# Patient Record
Sex: MALE | Race: WHITE | Employment: FULL TIME | URBAN - METROPOLITAN AREA
[De-identification: names, ages, dates, MRNs, and addresses within clinical notes are randomized per-mention and may not be internally consistent; named-entity substitution may affect disease eponyms.]

---

## 2018-05-12 PROBLEM — Z95.5 PRESENCE OF DRUG COATED STENT IN LAD CORONARY ARTERY: Status: ACTIVE | Noted: 2018-05-12

## 2018-05-12 PROBLEM — I25.10 CORONARY ARTERY DISEASE INVOLVING NATIVE CORONARY ARTERY OF NATIVE HEART WITHOUT ANGINA PECTORIS: Status: ACTIVE | Noted: 2018-05-12

## 2018-08-14 PROBLEM — N40.0 BPH WITHOUT OBSTRUCTION/LOWER URINARY TRACT SYMPTOMS: Status: ACTIVE | Noted: 2018-08-14

## 2018-08-14 PROBLEM — R97.20 ELEVATED PSA: Status: ACTIVE | Noted: 2018-08-14

## 2018-08-14 PROBLEM — E66.3 OVERWEIGHT (BMI 25.0-29.9): Status: ACTIVE | Noted: 2018-08-14

## 2019-05-28 PROCEDURE — 81015 MICROSCOPIC EXAM OF URINE: CPT | Performed by: UROLOGY

## 2019-06-06 PROBLEM — Z01.818 PREOP EXAMINATION: Status: ACTIVE | Noted: 2019-06-06

## 2019-09-23 PROBLEM — S32.010D COMPRESSION FRACTURE OF L1 VERTEBRA WITH ROUTINE HEALING: Status: ACTIVE | Noted: 2019-09-23

## 2019-09-27 PROBLEM — M54.50 ACUTE BILATERAL LOW BACK PAIN WITHOUT SCIATICA: Status: ACTIVE | Noted: 2019-09-27

## 2021-11-04 PROBLEM — R97.20 ELEVATED PSA: Status: RESOLVED | Noted: 2018-08-14 | Resolved: 2021-11-04

## 2025-01-17 RX ORDER — TAMSULOSIN HYDROCHLORIDE 0.4 MG/1
0.4 CAPSULE ORAL NIGHTLY
COMMUNITY

## 2025-01-17 NOTE — DISCHARGE INSTRUCTIONS
CYSTOSCOPY - Dr. Squires  Operative site:  Scant blood may appear in the urine and will clear in a few days. Drinking water and clear liquids is encouraged.  .  Local Anesthesia:  Resume your normal diet and activity as tolerated avoiding stress to the operative site. Caffeine, alcohol, citrus or spicy foods may worsen burning or urgency.      General anesthesia / Local with sedation:  The sedation stays in your body about 24 hours. You may have headache, soreness and aching, nausea and vomiting, dizziness, tiredness.  Sore throat and hoarseness can be present after general anesthesia only.  Drink liquids and eat light foods. Advance to your regular diet as tolerated Remain on liquids only if nausea or vomiting is present.  NO ALCHOHOLIC BEVERAGES FOR 24 HOURS.  For the next 24 hours rest, move cautiously, do not drive, operate equipment, or make any personal or legal decisions.      If you have a catheter:  Your catheter remains in place because a balloon close to the catheter tip was inflated with fluid immediately after the catheter’s insertion. The catheter will be removed in 3-4 days as a nurses visit. After removing the catheter,  it is normal to experience some stinging or burning with urination.    If prescription medication is ordered:  Take as directed.  Do not take on an empty stomach.   Do not drive or operate equipment.  Do not drink alcohol.  Call your doctor for:  Excessive bleeding/discoloration  Severe pain not controlled by pain medication.      Persistent nausea and vomiting.                        Temperature over 101 F.   Skin rash and general body itching.  Other concerns or questions not addressed in these instructions.    Follow any additional instructions given by the surgeon.   IN CASE OF EMERGENCY GO TO THE NEAREST EMERGENCY ROOM.   Call the office for an appointment in 2-4 weeks.  [phone]       [facility] IN[facility]CTIONS  [facility]  The medication  that you received for sedation or general anesthesia can last up to 24 hours. Your judgment and reflexes may be altered, even if you feel like your normal self.      We Recommend:   Do not drive any motor vehicle or bicycle   Avoid mowing the lawn, playing sports, or working with power tools/applicances (power saws, electric knives or mixers)   That you have someone stay with you on your first night home   Do not drink alcohol or take sleeping pills or tranquilizers   Do not sign legal documents within 24 hours of your procedure   If you had a nerve block for your surgery, take extra care not to put any pressure on your arm or hand for 24 hours    It is normal:  For you to have a sore throat if you had a breathing tube during surgery (while you were asleep!). The sore throat should get better within 48 hours. You can gargle with warm salt water (1/2 tsp in 4 oz warm water) or use a throat lozenge for comfort  To feel muscle aches or soreness especially in the abdomen, chest or neck. The achy feeling should go away in the next 24 hours  To feel weak, sleepy or \"wiped out\". Your should start feeling better in the next 24 hours.   To experience mild discomforts such as sore lip or tongue, headache, cramps, gas pains or a bloated feeling in your abdomen.   To experience mild back pain or soreness for a day or two if you had spinal or epidural anesthesia.   If you had laparoscopic surgery, to feel shoulder pain or discomfort on the day of surgery.   For some patients to have nausea after surgery/anesthesia    If you feel nausea or experience vomiting:   Try to move around less.   Eat less than usual or drink only liquids until the next morning   Nausea should resolve in about 24 hours    If you have a problem when you are at home:    Call your surgeons office   Discharge Instructions: After Your Surgery  You’ve just had surgery. During surgery, you were given medicine called anesthesia to keep you relaxed and free of  pain. After surgery, you may have some pain or nausea. This is common. Here are some tips for feeling better and getting well after surgery.   Going home  Your healthcare provider will show you how to take care of yourself when you go home. They'll also answer your questions. Have an adult family member or friend drive you home. For the first 24 hours after your surgery:   Don't drive or use heavy equipment.  Don't make important decisions or sign legal papers.  Take medicines as directed.  Don't drink alcohol.  Have someone stay with you, if needed. They can watch for problems and help keep you safe.  Be sure to go to all follow-up visits with your healthcare provider. And rest after your surgery for as long as your provider tells you to.   Coping with pain  If you have pain after surgery, pain medicine will help you feel better. Take it as directed, before pain becomes severe. Also, ask your healthcare provider or pharmacist about other ways to control pain. This might be with heat, ice, or relaxation. And follow any other instructions your surgeon or nurse gives you.      Stay on schedule with your medicine.     Tips for taking pain medicine  To get the best relief possible, remember these points:   Pain medicines can upset your stomach. Taking them with a little food may help.  Most pain relievers taken by mouth need at least 20 to 30 minutes to start to work.  Don't wait till your pain becomes severe before you take your medicine. Try to time your medicine so that you can take it before starting an activity. This might be before you get dressed, go for a walk, or sit down for dinner.  Constipation is a common side effect of some pain medicines. Call your healthcare provider before taking any medicines such as laxatives or stool softeners to help ease constipation. Also ask if you should skip any foods. Drinking lots of fluids and eating foods such as fruits and vegetables that are high in fiber can also help.  Remember, don't take laxatives unless your surgeon has prescribed them.  Drinking alcohol and taking pain medicine can cause dizziness and slow your breathing. It can even be deadly. Don't drink alcohol while taking pain medicine.  Pain medicine can make you react more slowly to things. Don't drive or run machinery while taking pain medicine.  Your healthcare provider may tell you to take acetaminophen to help ease your pain. Ask them how much you're supposed to take each day. Acetaminophen or other pain relievers may interact with your prescription medicines or other over-the-counter (OTC) medicines. Some prescription medicines have acetaminophen and other ingredients in them. Using both prescription and OTC acetaminophen for pain can cause you to accidentally overdose. Read the labels on your OTC medicines with care. This will help you to clearly know the list of ingredients, how much to take, and any warnings. It may also help you not take too much acetaminophen. If you have questions or don't understand the information, ask your pharmacist or healthcare provider to explain it to you before you take the OTC medicine.   Managing nausea  Some people have an upset stomach (nausea) after surgery. This is often because of anesthesia, pain, or pain medicine, less movement of food in the stomach, or the stress of surgery. These tips will help you handle nausea and eat healthy foods as you get better. If you were on a special food plan before surgery, ask your healthcare provider if you should follow it while you get better. Check with your provider on how your eating should progress. It may depend on the surgery you had. These general tips may help:   Don't push yourself to eat. Your body will tell you when to eat and how much.  Start off with clear liquids and soup. They're easier to digest.  Next try semi-solid foods as you feel ready. These include mashed potatoes, applesauce, and gelatin.  Slowly move to solid  foods. Don’t eat fatty, rich, or spicy foods at first.  Don't force yourself to have 3 large meals a day. Instead eat smaller amounts more often.  Take pain medicines with a small amount of solid food, such as crackers or toast. This helps prevent nausea.  When to call your healthcare provider  Call your healthcare provider right away if you have any of these:   You still have too much pain, or the pain gets worse, after taking the medicine. The medicine may not be strong enough. Or there may be a complication from the surgery.  You feel too sleepy, dizzy, or groggy. The medicine may be too strong.  Side effects such as nausea or vomiting. Your healthcare provider may advise taking other medicines to .  Skin changes such as rash, itching, or hives. This may mean you have an allergic reaction. Your provider may advise taking other medicines.  The incision looks different (for instance, part of it opens up).  Bleeding or fluid leaking from the incision site, and weren't told to expect that.  Fever of 100.4°F (38°C) or higher, or as directed by your provider.  Call 911  Call 911 right away if you have:   Trouble breathing  Facial swelling    If you have obstructive sleep apnea   You were given anesthesia medicine during surgery to keep you comfortable and free of pain. After surgery, you may have more apnea spells because of this medicine and other medicines you were given. The spells may last longer than normal.    At home:  Keep using the continuous positive airway pressure (CPAP) device when you sleep. Unless your healthcare provider tells you not to, use it when you sleep, day or night. CPAP is a common device used to treat obstructive sleep apnea.  Talk with your provider before taking any pain medicine, muscle relaxants, or sedatives. Your provider will tell you about the possible dangers of taking these medicines.  Contact your provider if your sleeping changes a lot even when taking medicines as directed.

## 2025-01-20 ENCOUNTER — HOSPITAL ENCOUNTER (OUTPATIENT)
Facility: HOSPITAL | Age: 70
Setting detail: HOSPITAL OUTPATIENT SURGERY
Discharge: HOME | End: 2025-01-20
Attending: UROLOGY | Admitting: UROLOGY
Payer: MEDICARE

## 2025-01-20 ENCOUNTER — ANESTHESIA EVENT (OUTPATIENT)
Dept: SURGERY | Facility: HOSPITAL | Age: 70
End: 2025-01-20
Payer: MEDICARE

## 2025-01-20 ENCOUNTER — ANESTHESIA (OUTPATIENT)
Dept: SURGERY | Facility: HOSPITAL | Age: 70
End: 2025-01-20
Payer: MEDICARE

## 2025-01-20 VITALS
DIASTOLIC BLOOD PRESSURE: 64 MMHG | HEART RATE: 44 BPM | RESPIRATION RATE: 12 BRPM | OXYGEN SATURATION: 96 % | WEIGHT: 203 LBS | BODY MASS INDEX: 30.41 KG/M2 | HEIGHT: 68.5 IN | SYSTOLIC BLOOD PRESSURE: 125 MMHG | TEMPERATURE: 98 F

## 2025-01-20 PROCEDURE — 88307 TISSUE EXAM BY PATHOLOGIST: CPT | Performed by: UROLOGY

## 2025-01-20 PROCEDURE — 0TBB8ZZ EXCISION OF BLADDER, VIA NATURAL OR ARTIFICIAL OPENING ENDOSCOPIC: ICD-10-PCS | Performed by: UROLOGY

## 2025-01-20 RX ORDER — MORPHINE SULFATE 4 MG/ML
4 INJECTION, SOLUTION INTRAMUSCULAR; INTRAVENOUS EVERY 10 MIN PRN
Status: DISCONTINUED | OUTPATIENT
Start: 2025-01-20 | End: 2025-01-20

## 2025-01-20 RX ORDER — NALOXONE HYDROCHLORIDE 0.4 MG/ML
0.08 INJECTION, SOLUTION INTRAMUSCULAR; INTRAVENOUS; SUBCUTANEOUS AS NEEDED
Status: DISCONTINUED | OUTPATIENT
Start: 2025-01-20 | End: 2025-01-20

## 2025-01-20 RX ORDER — MORPHINE SULFATE 4 MG/ML
2 INJECTION, SOLUTION INTRAMUSCULAR; INTRAVENOUS EVERY 10 MIN PRN
Status: DISCONTINUED | OUTPATIENT
Start: 2025-01-20 | End: 2025-01-20

## 2025-01-20 RX ORDER — SODIUM CHLORIDE, SODIUM LACTATE, POTASSIUM CHLORIDE, CALCIUM CHLORIDE 600; 310; 30; 20 MG/100ML; MG/100ML; MG/100ML; MG/100ML
INJECTION, SOLUTION INTRAVENOUS CONTINUOUS
Status: DISCONTINUED | OUTPATIENT
Start: 2025-01-20 | End: 2025-01-20

## 2025-01-20 RX ORDER — HYDROMORPHONE HYDROCHLORIDE 1 MG/ML
0.2 INJECTION, SOLUTION INTRAMUSCULAR; INTRAVENOUS; SUBCUTANEOUS EVERY 5 MIN PRN
Status: DISCONTINUED | OUTPATIENT
Start: 2025-01-20 | End: 2025-01-20

## 2025-01-20 RX ORDER — HYDROMORPHONE HYDROCHLORIDE 1 MG/ML
0.6 INJECTION, SOLUTION INTRAMUSCULAR; INTRAVENOUS; SUBCUTANEOUS EVERY 5 MIN PRN
Status: DISCONTINUED | OUTPATIENT
Start: 2025-01-20 | End: 2025-01-20

## 2025-01-20 RX ORDER — GLYCOPYRROLATE 0.2 MG/ML
INJECTION, SOLUTION INTRAMUSCULAR; INTRAVENOUS AS NEEDED
Status: DISCONTINUED | OUTPATIENT
Start: 2025-01-20 | End: 2025-01-20 | Stop reason: SURG

## 2025-01-20 RX ORDER — EPHEDRINE SULFATE 50 MG/ML
INJECTION INTRAVENOUS AS NEEDED
Status: DISCONTINUED | OUTPATIENT
Start: 2025-01-20 | End: 2025-01-20 | Stop reason: SURG

## 2025-01-20 RX ORDER — HYDROMORPHONE HYDROCHLORIDE 1 MG/ML
0.4 INJECTION, SOLUTION INTRAMUSCULAR; INTRAVENOUS; SUBCUTANEOUS EVERY 5 MIN PRN
Status: DISCONTINUED | OUTPATIENT
Start: 2025-01-20 | End: 2025-01-20

## 2025-01-20 RX ORDER — ROCURONIUM BROMIDE 10 MG/ML
INJECTION, SOLUTION INTRAVENOUS AS NEEDED
Status: DISCONTINUED | OUTPATIENT
Start: 2025-01-20 | End: 2025-01-20 | Stop reason: SURG

## 2025-01-20 RX ORDER — MORPHINE SULFATE 10 MG/ML
6 INJECTION, SOLUTION INTRAMUSCULAR; INTRAVENOUS EVERY 10 MIN PRN
Status: DISCONTINUED | OUTPATIENT
Start: 2025-01-20 | End: 2025-01-20

## 2025-01-20 RX ORDER — ONDANSETRON 2 MG/ML
INJECTION INTRAMUSCULAR; INTRAVENOUS AS NEEDED
Status: DISCONTINUED | OUTPATIENT
Start: 2025-01-20 | End: 2025-01-20 | Stop reason: SURG

## 2025-01-20 RX ORDER — ACETAMINOPHEN 500 MG
1000 TABLET ORAL ONCE
Status: COMPLETED | OUTPATIENT
Start: 2025-01-20 | End: 2025-01-20

## 2025-01-20 RX ADMIN — ROCURONIUM BROMIDE 40 MG: 10 INJECTION, SOLUTION INTRAVENOUS at 13:41:00

## 2025-01-20 RX ADMIN — GLYCOPYRROLATE 0.2 MG: 0.2 INJECTION, SOLUTION INTRAMUSCULAR; INTRAVENOUS at 13:52:00

## 2025-01-20 RX ADMIN — ONDANSETRON 4 MG: 2 INJECTION INTRAMUSCULAR; INTRAVENOUS at 14:28:00

## 2025-01-20 RX ADMIN — EPHEDRINE SULFATE 5 MG: 50 INJECTION INTRAVENOUS at 13:54:00

## 2025-01-20 RX ADMIN — ROCURONIUM BROMIDE 10 MG: 10 INJECTION, SOLUTION INTRAVENOUS at 14:12:00
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
## 2025-01-20 NOTE — ANESTHESIA POSTPROCEDURE EVALUATION
Patient: Gee Doherty    Procedure Summary       Date: 01/20/25 Room / Location: [facility] MAIN OR 06 / [facility] MAIN OR    Anesthesia Start: 1337 Anesthesia Stop: 1445    Procedure: Cystoscopy, transurethral resection of a bladder tumor (Bladder) Diagnosis: (Gross hematuria, bladder mass)    Surgeons: Lori Squires MD Anesthesiologist: Donavon Malik MD    Anesthesia Type: general ASA Status: 2            Anesthesia Type: general    Vitals Value Taken Time   /60 01/20/25 1443   Temp 98.3 01/20/25 1445   Pulse 44 01/20/25 1445   Resp 14 01/20/25 1445   SpO2 99 % 01/20/25 1445   Vitals shown include unfiled device data.    [facility] AN Post Evaluation:   Patient Evaluated in PACU  Patient Participation: complete - patient participated  Level of Consciousness: awake and awake and alert  Pain Score: 0  Pain Management: adequateYes    Nausea/Vomiting: none  Cardiovascular Status: acceptable  Respiratory Status: acceptable  Postoperative Hydration acceptable      Neeru Espinoza CRNA  1/20/2025 2:45 PM

## 2025-01-20 NOTE — ANESTHESIA PROCEDURE NOTES
Airway  Date/Time: 1/20/2025 1:43 PM  Urgency: Elective      General Information and Staff    Patient location during procedure: OR  Resident/CRNA: Neeru Espinoza CRNA  Performed: CRNA   Performed by: Neeru Espinoza CRNA  Authorized by: Donavon Malik MD      Indications and Patient Condition  Indications for airway management: anesthesia  Spontaneous Ventilation: absent  Sedation level: deep  Preoxygenated: yes  Patient position: sniffing  Mask difficulty assessment: 1 - vent by mask    Final Airway Details  Final airway type: endotracheal airway      Successful airway: ETT  Cuffed: yes   Successful intubation technique: direct laryngoscopy  Endotracheal tube insertion site: oral  Blade size: #4  ETT size (mm): 7.5    Cormack-Lehane Classification: grade IIA - partial view of glottis  Placement verified by: capnometry   Measured from: teeth  Number of attempts at approach: 1
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
## 2025-01-20 NOTE — OPERATIVE REPORT
[facility]    Gee Dohetry Patient Status:  Hospital Outpatient Surgery    1955 MRN [id number]   Location [facility] OPERATING ROOM Attending Lori Squires MD   Hosp Day # 0 [facility] Ricardo Peter MD     Date of Operation; 2025    Procedure: Cystoscopy, Transurethral Resection of Bladder Tumor    Pre-Op Diagnosis:  Bladder Tumor    Post-Op Diagnosis: Same    Surgeon: Lori Squires M.D.    Anesthesia:  General    Indications:  hematuria, L anterior/lateral bladder mass    Findings: Anterior Urethra was normal.  The prostate was  obstructing/friable/ and hemorrhagic at bladder neck. The ureteral orifices were in the normal position. The bladder showed a tumor measuring 5cm, located L anterior/lateral. After resesction, the underlying tissue was supple and hemostatic.    Specimens: Bladder Tumor    Blood Loss:  Less than 1 ml    Complications:  None    Drains:  20 FR 3-way ryder    Technique:                               A general anesthesia was induced.  Preoperative  antibiotics were administered.  The patient was prepped and draped in  the dorsal lithotomy position.  Sequential compression devices were  in place on the lower leg.  A time-out was reviewed.     A cystoscope was passed through the urethra under direct vision and the urethra and bladder were examined, with the findings as described above.The 26-Tongan resectoscope sheath was passed into the  urethra, and then a 24-Tongan loop was used to resect the tumor. The specimen was irrigated out of the bladder with a Toomy sringe.The base of the resection was coagulated. The irrigation was returning with only the slightest pink tinge.  The patient tolerated the procedure well.  CBI will be administered in recovery.he will go home with ryder and have a void trial in 3-4 days.  Further treatment will be based on pathology.     Lori Squires MD  2025  2:35 PM

## 2025-06-10 ENCOUNTER — HOSPITAL ENCOUNTER (INPATIENT)
Facility: HOSPITAL | Age: 70
LOS: 1 days | Discharge: HOME | End: 2025-06-12
Attending: EMERGENCY MEDICINE | Admitting: HOSPITALIST
Payer: MEDICARE

## 2025-06-10 DIAGNOSIS — R31.9 HEMATURIA, UNSPECIFIED TYPE: Primary | ICD-10-CM

## 2025-06-10 LAB
ANION GAP SERPL CALC-SCNC: 9 MMOL/L (ref 0–18)
BASOPHILS # BLD AUTO: 0.03 X10(3) UL (ref 0–0.2)
BASOPHILS NFR BLD AUTO: 0.2 %
BUN BLD-MCNC: 14 MG/DL (ref 9–23)
BUN/CREAT SERPL: 14 (ref 10–20)
CALCIUM BLD-MCNC: 8.7 MG/DL (ref 8.7–10.4)
CHLORIDE SERPL-SCNC: 98 MMOL/L (ref 98–112)
CO2 SERPL-SCNC: 26 MMOL/L (ref 21–32)
CREAT BLD-MCNC: 1 MG/DL (ref 0.7–1.3)
DEPRECATED RDW RBC AUTO: 41.6 FL (ref 35.1–46.3)
EGFRCR SERPLBLD CKD-EPI 2021: 80 ML/MIN/1.73M2 (ref 60–?)
EOSINOPHIL # BLD AUTO: 0 X10(3) UL (ref 0–0.7)
EOSINOPHIL NFR BLD AUTO: 0 %
ERYTHROCYTE [DISTWIDTH] IN BLOOD BY AUTOMATED COUNT: 12.3 % (ref 11–15)
GLUCOSE BLD-MCNC: 135 MG/DL (ref 70–99)
HCT VFR BLD AUTO: 37.1 % (ref 39–53)
HGB BLD-MCNC: 12.5 G/DL (ref 13–17.5)
IMM GRANULOCYTES # BLD AUTO: 0.06 X10(3) UL (ref 0–1)
IMM GRANULOCYTES NFR BLD: 0.4 %
LYMPHOCYTES # BLD AUTO: 0.59 X10(3) UL (ref 1–4)
LYMPHOCYTES NFR BLD AUTO: 4 %
MCH RBC QN AUTO: 31 PG (ref 26–34)
MCHC RBC AUTO-ENTMCNC: 33.7 G/DL (ref 31–37)
MCV RBC AUTO: 92.1 FL (ref 80–100)
MONOCYTES # BLD AUTO: 1.17 X10(3) UL (ref 0.1–1)
MONOCYTES NFR BLD AUTO: 7.9 %
NEUTROPHILS # BLD AUTO: 12.95 X10 (3) UL (ref 1.5–7.7)
NEUTROPHILS # BLD AUTO: 12.95 X10(3) UL (ref 1.5–7.7)
NEUTROPHILS NFR BLD AUTO: 87.5 %
OSMOLALITY SERPL CALC.SUM OF ELEC: 279 MOSM/KG (ref 275–295)
PLATELET # BLD AUTO: 210 10(3)UL (ref 150–450)
POTASSIUM SERPL-SCNC: 3.5 MMOL/L (ref 3.5–5.1)
RBC # BLD AUTO: 4.03 X10(6)UL (ref 3.8–5.8)
SODIUM SERPL-SCNC: 133 MMOL/L (ref 136–145)
WBC # BLD AUTO: 14.8 X10(3) UL (ref 4–11)

## 2025-06-10 PROCEDURE — 51700 IRRIGATION OF BLADDER: CPT

## 2025-06-10 PROCEDURE — 99285 EMERGENCY DEPT VISIT HI MDM: CPT

## 2025-06-10 PROCEDURE — 36415 COLL VENOUS BLD VENIPUNCTURE: CPT

## 2025-06-10 PROCEDURE — 80048 BASIC METABOLIC PNL TOTAL CA: CPT | Performed by: EMERGENCY MEDICINE

## 2025-06-10 PROCEDURE — 85025 COMPLETE CBC W/AUTO DIFF WBC: CPT | Performed by: EMERGENCY MEDICINE

## 2025-06-11 PROBLEM — R31.9 HEMATURIA, UNSPECIFIED TYPE: Status: ACTIVE | Noted: 2025-06-11

## 2025-06-11 LAB
ANION GAP SERPL CALC-SCNC: 8 MMOL/L (ref 0–18)
BUN BLD-MCNC: 14 MG/DL (ref 9–23)
BUN/CREAT SERPL: 16.9 (ref 10–20)
CALCIUM BLD-MCNC: 8.6 MG/DL (ref 8.7–10.4)
CHLORIDE SERPL-SCNC: 104 MMOL/L (ref 98–112)
CO2 SERPL-SCNC: 27 MMOL/L (ref 21–32)
CREAT BLD-MCNC: 0.83 MG/DL (ref 0.7–1.3)
EGFRCR SERPLBLD CKD-EPI 2021: 93 ML/MIN/1.73M2 (ref 60–?)
GLUCOSE BLD-MCNC: 106 MG/DL (ref 70–99)
OSMOLALITY SERPL CALC.SUM OF ELEC: 289 MOSM/KG (ref 275–295)
POTASSIUM SERPL-SCNC: 3.8 MMOL/L (ref 3.5–5.1)
POTASSIUM SERPL-SCNC: 3.8 MMOL/L (ref 3.5–5.1)
SODIUM SERPL-SCNC: 139 MMOL/L (ref 136–145)

## 2025-06-11 PROCEDURE — 84132 ASSAY OF SERUM POTASSIUM: CPT | Performed by: INTERNAL MEDICINE

## 2025-06-11 PROCEDURE — 80048 BASIC METABOLIC PNL TOTAL CA: CPT | Performed by: INTERNAL MEDICINE

## 2025-06-11 RX ORDER — DOCUSATE SODIUM 100 MG/1
100 CAPSULE, LIQUID FILLED ORAL 2 TIMES DAILY
COMMUNITY

## 2025-06-11 RX ORDER — SENNOSIDES 8.6 MG
17.2 TABLET ORAL NIGHTLY PRN
Status: DISCONTINUED | OUTPATIENT
Start: 2025-06-11 | End: 2025-06-11

## 2025-06-11 RX ORDER — ACETAMINOPHEN 500 MG
500 TABLET ORAL EVERY 4 HOURS PRN
Status: DISCONTINUED | OUTPATIENT
Start: 2025-06-11 | End: 2025-06-12

## 2025-06-11 RX ORDER — SODIUM CHLORIDE, SODIUM LACTATE, POTASSIUM CHLORIDE, CALCIUM CHLORIDE 600; 310; 30; 20 MG/100ML; MG/100ML; MG/100ML; MG/100ML
INJECTION, SOLUTION INTRAVENOUS ONCE
Status: COMPLETED | OUTPATIENT
Start: 2025-06-11 | End: 2025-06-11

## 2025-06-11 RX ORDER — POTASSIUM CHLORIDE 1500 MG/1
40 TABLET, EXTENDED RELEASE ORAL EVERY 4 HOURS
Status: COMPLETED | OUTPATIENT
Start: 2025-06-11 | End: 2025-06-11

## 2025-06-11 RX ORDER — TAMSULOSIN HYDROCHLORIDE 0.4 MG/1
0.4 CAPSULE ORAL NIGHTLY
Status: DISCONTINUED | OUTPATIENT
Start: 2025-06-11 | End: 2025-06-12

## 2025-06-11 RX ORDER — POLYETHYLENE GLYCOL 3350 17 G/17G
17 POWDER, FOR SOLUTION ORAL DAILY
Status: DISCONTINUED | OUTPATIENT
Start: 2025-06-11 | End: 2025-06-12

## 2025-06-11 RX ORDER — SENNOSIDES 8.6 MG
17.2 TABLET ORAL 2 TIMES DAILY
Status: DISCONTINUED | OUTPATIENT
Start: 2025-06-11 | End: 2025-06-12

## 2025-06-11 RX ORDER — POLYETHYLENE GLYCOL 3350 17 G/17G
17 POWDER, FOR SOLUTION ORAL DAILY PRN
Status: DISCONTINUED | OUTPATIENT
Start: 2025-06-11 | End: 2025-06-11

## 2025-06-11 RX ORDER — MAGNESIUM HYDROXIDE 1200 MG/15ML
3000 LIQUID ORAL CONTINUOUS
Status: DISCONTINUED | OUTPATIENT
Start: 2025-06-11 | End: 2025-06-12

## 2025-06-11 RX ORDER — SODIUM PHOSPHATE, DIBASIC AND SODIUM PHOSPHATE, MONOBASIC 7; 19 G/230ML; G/230ML
1 ENEMA RECTAL ONCE AS NEEDED
Status: DISCONTINUED | OUTPATIENT
Start: 2025-06-11 | End: 2025-06-12

## 2025-06-11 RX ORDER — SODIUM CHLORIDE 9 MG/ML
INJECTION, SOLUTION INTRAVENOUS CONTINUOUS
Status: DISCONTINUED | OUTPATIENT
Start: 2025-06-11 | End: 2025-06-11

## 2025-06-11 RX ORDER — DOCUSATE SODIUM 100 MG/1
100 CAPSULE, LIQUID FILLED ORAL 2 TIMES DAILY
Status: DISCONTINUED | OUTPATIENT
Start: 2025-06-11 | End: 2025-06-12

## 2025-06-11 RX ORDER — LISINOPRIL 2.5 MG/1
2.5 TABLET ORAL DAILY
Status: DISCONTINUED | OUTPATIENT
Start: 2025-06-11 | End: 2025-06-12

## 2025-06-11 RX ORDER — BISACODYL 10 MG
10 SUPPOSITORY, RECTAL RECTAL
Status: DISCONTINUED | OUTPATIENT
Start: 2025-06-11 | End: 2025-06-12

## 2025-06-11 RX ORDER — ATORVASTATIN CALCIUM 40 MG/1
40 TABLET, FILM COATED ORAL NIGHTLY
Status: DISCONTINUED | OUTPATIENT
Start: 2025-06-11 | End: 2025-06-12

## 2025-06-11 NOTE — ED INITIAL ASSESSMENT (HPI)
Pt c/o hematuria and urinary retention that's been going on and off for the last few weeks but getting worse and now causing abd spasms. Pt states that he is bleeding much more today and passing clots.     Pt has bladder cancer
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
## 2025-06-11 NOTE — ED QUICK NOTES
Orders for admission, patient is aware of plan and ready to go upstairs. Any questions, please call ED RN Sanam at extension [phone].     Patient Covid vaccination status: Fully vaccinated     COVID Test Ordered in ED: None    COVID Suspicion at Admission: N/A    Running Infusions: Medication Infusions[1] None    Mental Status/LOC at time of transport: A/O x3    Other pertinent information: CBI in process  CIWA score: N/A   NIH score:  N/A               [1]

## 2025-06-11 NOTE — ED QUICK NOTES
2- 3000 mL irrigation bags completed for CBI. Per MD, continue with an additional 2-3000 mL irrigation bags.
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
## 2025-06-11 NOTE — PLAN OF CARE
Pt admitted from the ED for hematuria. Pt is alert and oriented on RA. CBI running at moderate rate. Pt is NPO. Pt denies pain. Urology is on consult. Call light is within reach and safety measures are in place.    Problem: GENITOURINARY - ADULT  Goal: Absence of urinary retention  Description: INTERVENTIONS:  - Assess patient’s ability to void and empty bladder  - Monitor intake/output and perform bladder scan as needed  - Follow urinary retention protocol/standard of care  - Consider collaborating with pharmacy to review patient's medication profile  - Implement strategies to promote bladder emptying  Outcome: Progressing     Problem: METABOLIC/FLUID AND ELECTROLYTES - ADULT  Goal: Electrolytes maintained within normal limits  Description: INTERVENTIONS:  - Monitor labs and rhythm and assess patient for signs and symptoms of electrolyte imbalances  - Administer electrolyte replacement as ordered  - Monitor response to electrolyte replacements, including rhythm and repeat lab results as appropriate  - Fluid restriction as ordered  - Instruct patient on fluid and nutrition restrictions as appropriate  Outcome: Progressing     Problem: HEMATOLOGIC - ADULT  Goal: Maintains hematologic stability  Description: INTERVENTIONS  - Assess for signs and symptoms of bleeding or hemorrhage  - Monitor labs and vital signs for trends  - Administer supportive blood products/factors, fluids and medications as ordered and appropriate  - Administer supportive blood products/factors as ordered and appropriate  Outcome: Progressing

## 2025-06-11 NOTE — PLAN OF CARE
Gee is A&Ox4. Denies pain. Vitals stable. Tolerating Diet. CBI continued. Manual Irrigation required this Afternoon due to blood clotting. After manual irrigation CBI output clear yellow. K+ replaced. Bowel regimen initiated today. IVF continuous. Ambulated with SBA. Call light in reach. Plan to clamp CBI at 0600 in morning.    Problem: Patient Centered Care  Goal: Patient preferences are identified and integrated in the patient's plan of care  Description: Interventions:  - What would you like us to know as we care for you?   - Provide timely, complete, and accurate information to patient/family  - Incorporate patient and family knowledge, values, beliefs, and cultural backgrounds into the planning and delivery of care  - Encourage patient/family to participate in care and decision-making at the level they choose  - Honor patient and family perspectives and choices  Outcome: Progressing     Problem: GENITOURINARY - ADULT  Goal: Absence of urinary retention  Description: INTERVENTIONS:  - Assess patient’s ability to void and empty bladder  - Monitor intake/output and perform bladder scan as needed  - Follow urinary retention protocol/standard of care  - Consider collaborating with pharmacy to review patient's medication profile  - Implement strategies to promote bladder emptying  Outcome: Progressing     Problem: METABOLIC/FLUID AND ELECTROLYTES - ADULT  Goal: Electrolytes maintained within normal limits  Description: INTERVENTIONS:  - Monitor labs and rhythm and assess patient for signs and symptoms of electrolyte imbalances  - Administer electrolyte replacement as ordered  - Monitor response to electrolyte replacements, including rhythm and repeat lab results as appropriate  - Fluid restriction as ordered  - Instruct patient on fluid and nutrition restrictions as appropriate  Outcome: Progressing     Problem: HEMATOLOGIC - ADULT  Goal: Maintains hematologic stability  Description: INTERVENTIONS  - Assess for  signs and symptoms of bleeding or hemorrhage  - Monitor labs and vital signs for trends  - Administer supportive blood products/factors, fluids and medications as ordered and appropriate  - Administer supportive blood products/factors as ordered and appropriate  Outcome: Progressing

## 2025-06-12 VITALS
DIASTOLIC BLOOD PRESSURE: 65 MMHG | OXYGEN SATURATION: 95 % | RESPIRATION RATE: 16 BRPM | BODY MASS INDEX: 27.92 KG/M2 | SYSTOLIC BLOOD PRESSURE: 115 MMHG | WEIGHT: 195 LBS | HEART RATE: 59 BPM | HEIGHT: 70 IN | TEMPERATURE: 98 F

## 2025-06-12 LAB
ANION GAP SERPL CALC-SCNC: 7 MMOL/L (ref 0–18)
BUN BLD-MCNC: 14 MG/DL (ref 9–23)
BUN/CREAT SERPL: 18.9 (ref 10–20)
CALCIUM BLD-MCNC: 8.4 MG/DL (ref 8.7–10.4)
CHLORIDE SERPL-SCNC: 105 MMOL/L (ref 98–112)
CO2 SERPL-SCNC: 27 MMOL/L (ref 21–32)
CREAT BLD-MCNC: 0.74 MG/DL (ref 0.7–1.3)
DEPRECATED RDW RBC AUTO: 42.6 FL (ref 35.1–46.3)
EGFRCR SERPLBLD CKD-EPI 2021: 96 ML/MIN/1.73M2 (ref 60–?)
ERYTHROCYTE [DISTWIDTH] IN BLOOD BY AUTOMATED COUNT: 12.3 % (ref 11–15)
GLUCOSE BLD-MCNC: 99 MG/DL (ref 70–99)
HCT VFR BLD AUTO: 36.6 % (ref 39–53)
HGB BLD-MCNC: 12.3 G/DL (ref 13–17.5)
MAGNESIUM SERPL-MCNC: 1.9 MG/DL (ref 1.6–2.6)
MCH RBC QN AUTO: 31.4 PG (ref 26–34)
MCHC RBC AUTO-ENTMCNC: 33.6 G/DL (ref 31–37)
MCV RBC AUTO: 93.4 FL (ref 80–100)
OSMOLALITY SERPL CALC.SUM OF ELEC: 289 MOSM/KG (ref 275–295)
PLATELET # BLD AUTO: 186 10(3)UL (ref 150–450)
POTASSIUM SERPL-SCNC: 4.1 MMOL/L (ref 3.5–5.1)
RBC # BLD AUTO: 3.92 X10(6)UL (ref 3.8–5.8)
SODIUM SERPL-SCNC: 139 MMOL/L (ref 136–145)
WBC # BLD AUTO: 8.2 X10(3) UL (ref 4–11)

## 2025-06-12 PROCEDURE — 85027 COMPLETE CBC AUTOMATED: CPT | Performed by: HOSPITALIST

## 2025-06-12 PROCEDURE — 80048 BASIC METABOLIC PNL TOTAL CA: CPT | Performed by: HOSPITALIST

## 2025-06-12 PROCEDURE — 83735 ASSAY OF MAGNESIUM: CPT | Performed by: HOSPITALIST

## 2025-06-12 RX ORDER — POLYETHYLENE GLYCOL 3350 17 G/17G
17 POWDER, FOR SOLUTION ORAL DAILY
Status: SHIPPED | COMMUNITY
Start: 2025-06-13

## 2025-06-12 RX ORDER — SULFAMETHOXAZOLE AND TRIMETHOPRIM 800; 160 MG/1; MG/1
1 TABLET ORAL 2 TIMES DAILY
Qty: 6 TABLET | Refills: 0 | Status: SHIPPED | OUTPATIENT
Start: 2025-06-12

## 2025-06-12 RX ORDER — SULFAMETHOXAZOLE AND TRIMETHOPRIM 800; 160 MG/1; MG/1
1 TABLET ORAL EVERY 12 HOURS SCHEDULED
Status: DISCONTINUED | OUTPATIENT
Start: 2025-06-12 | End: 2025-06-12

## 2025-06-12 NOTE — PROGRESS NOTES
[facility]  part of [facility]    Progress Note    Gee Doherty Patient Status:  Inpatient    1955 MRN [id number]   Location [facility] 4W/SW/SE Attending Chava Steven MD   Hosp Day # 1 PCP Ricardo Peter MD     Subjective:   Gee Doherty is a(n) 70 year old male admitted for hematuria s/p TURBT in 25  Urine is clear  CBI stopped this morning  No pain  No complaints      Objective:   Blood pressure 115/65, pulse 59, temperature 98.1 °F (36.7 °C), temperature source Oral, resp. rate 16, height 70\", weight 195 lb (88.5 kg), SpO2 95%.    Awake alert  Abd soft  Ryder irrigated and is clear  Removed ryder      Results:   Lab Results   Component Value Date    WBC 14.8 (H) 06/10/2025    HGB 12.5 (L) 06/10/2025    HCT 37.1 (L) 06/10/2025    .0 06/10/2025    CREATSERUM 0.83 2025    BUN 14 2025     2025    K 3.8 2025    K 3.8 2025     2025    CO2 27.0 2025     (H) 2025    CA 8.6 (L) 2025    ALB 4.6 2021    ALKPHO 87 2021    BILT 0.87 2021    TP 7.2 2021    AST 20 2021    ALT 24 2021    T4F 0.76 2015    TSH 4.170 2021    PSA 3.18 2021       No results found.        Assessment & Plan:     Hematuria, unspecified type  History of TURBT for benign path  Suspected UTI   No culture sent   Plan for Bactrim DS PO BID x 3 days   Dose here   Urine is clear   Ryder irrigated   Ryder removed    DC home  Follow up in 2 weeks with [facility] for UA/PVR check  Discussed with patient and nursing    Patient will require inpatient services that will reasonably be expected to span two midnight's based on the clinical documentation in H+P.   Based on patients current state of illness, I anticipate that, after discharge, patient will require TBD.      Juanito Chakraborty MD  2025

## 2025-06-12 NOTE — PLAN OF CARE
A/O x 4. Pt able to void s/p ryder removal, PVR 40mL. Denies pain. Up ad kwame. Tolerating diet. Patient cleared for discharge by all consulting MDs. Discharge instructions explained to patient. All medications reviewed, including which medications to start, continue, or stop taking. All follow up appointments reviewed. All discharge eduation explained to pt. Patient verbalized understanding, all questions answered.

## 2025-06-12 NOTE — PLAN OF CARE
Problem: Patient Centered Care  Goal: Patient preferences are identified and integrated in the patient's plan of care  Description: Interventions:  - What would you like us to know as we care for you? I am from home  - Provide timely, complete, and accurate information to patient/family  - Incorporate patient and family knowledge, values, beliefs, and cultural backgrounds into the planning and delivery of care  - Encourage patient/family to participate in care and decision-making at the level they choose  - Honor patient and family perspectives and choices  Outcome: Progressing     Problem: GENITOURINARY - ADULT  Goal: Absence of urinary retention  Description: INTERVENTIONS:  - Assess patient’s ability to void and empty bladder  - Monitor intake/output and perform bladder scan as needed  - Follow urinary retention protocol/standard of care  - Consider collaborating with pharmacy to review patient's medication profile  - Implement strategies to promote bladder emptying  Outcome: Progressing     Problem: METABOLIC/FLUID AND ELECTROLYTES - ADULT  Goal: Electrolytes maintained within normal limits  Description: INTERVENTIONS:  - Monitor labs and rhythm and assess patient for signs and symptoms of electrolyte imbalances  - Administer electrolyte replacement as ordered  - Monitor response to electrolyte replacements, including rhythm and repeat lab results as appropriate  - Fluid restriction as ordered  - Instruct patient on fluid and nutrition restrictions as appropriate  Outcome: Progressing     Problem: HEMATOLOGIC - ADULT  Goal: Maintains hematologic stability  Description: INTERVENTIONS  - Assess for signs and symptoms of bleeding or hemorrhage  - Monitor labs and vital signs for trends  - Administer supportive blood products/factors, fluids and medications as ordered and appropriate  - Administer supportive blood products/factors as ordered and appropriate  Outcome: Progressing

## 2025-06-12 NOTE — DISCHARGE SUMMARY
Hospitalist Discharge Summary    Admission Date/Time  6/10/2025  8:39 PM  Discharge Date  06/12/25    PCP  Ricardo Peter MD     Discharging Hospitalist:  Cheyenne Larkin NP with Dr Steven    Disposition:  Home, no needs     Follow Up Appointments  Micki Rosa PA-C  [address]  [facility] [address]  [phone]    Follow up in 2 week(s)  UA and PVR check    Ricardo Peter MD  [address]  [facility] [address]  [phone]    Follow up in 3 day(s)      Primary Hospital Problems/Hospital Course Summary  71 yo male who with hx HL, BPH, HTN, vitamin d def, CAD/MI S/P PCI LAD, eczema, bladder cancer s/p TURBT 5/22/2025 who presents with urinary retention with hematuria/clots as well as constipation s/p CBI with improvement.      Urinary Retention with hematuria/Clots  High Grade TCC Bladder Cancer   -5/22/2025 S/P TURBT with pathology c/w high grade TCC  -admission hgb 12.5, discharge hgb 12.3  -stop naproxen, use tylenol for pain  -resume asa 6/16  -6/10 s/p ryder with CBI  -follow up with urology PA 2 weeks for UA and PVR    Possible UTI  -UA suggestive of UTI in outpt setting, no urine sent in hospital  -bactrim x 3 days as per urology      Constipation   -bowel regimen     Chronic Medical Problems:     HTN- continue lisinipril  HL-continue statin  CAD/MI S/P PCI-continue statin. [facility]  BPH- continue flomax     Medication Changes  Hold asa until 6/16  Stop naproxen  Complete bactrim x 3 days for UTI       Change in Code Status: NO, full code     Discharge Medications       Medication List        PAUSE taking these medications      aspirin 81 MG Tabs  Wait to take this until: June 16, 2025            START taking these medications      Polyethylene Glycol 3350 17 g Pack  Commonly known as: MIRALAX  Start taking on: June 13, 2025     sulfamethoxazole-trimethoprim -160 MG Tabs per tablet  Commonly known as: Bactrim DS  Take 1 tablet by mouth 2  (two) times daily. Start taking 1 day prior to catheter removal            CHANGE how you take these medications      lisinopril 2.5 MG Tabs  Commonly known as: Prinivil; Zestril  Take 1 tablet (2.5 mg total) by mouth daily.  What changed: when to take this            CONTINUE taking these medications      atorvastatin 40 MG Tabs  Commonly known as: Lipitor  Take 1 tablet (40 mg total) by mouth nightly.     docusate sodium 100 MG Caps  Commonly known as: Colace     tamsulosin 0.4 MG Caps  Commonly known as: Flomax     Vitamin D 50 MCG (2000 UT) Tabs            STOP taking these medications      NAPROXEN SODIUM OR               Where to Get Your Medications        These medications were sent to [facility] [address] [place] [facility] [address] [facility] [phone], [phone]  [address] [facility] [address]      Phone: [phone]   sulfamethoxazole-trimethoprim -160 MG Tabs per tablet       I reconciled current and discharge medications on the day of discharge.      Pertinent Physical Exam At Time of Discharge  Vitals:    06/11/25 0917 06/11/25 1226 06/11/25 2034 06/12/25 0347   BP: 128/54 113/59 135/67 115/65   BP Location: Left arm Left arm Right arm Right arm   Pulse: 64 57 64 59   Resp: 18 18 18 16   Temp: 99 °F (37.2 °C) 98.3 °F (36.8 °C) 98.1 °F (36.7 °C)    TempSrc: Oral Oral Oral    SpO2: 92% 95% 98% 95%   Weight:       Height:            General: no acute distress  Heart: RRR  Lungs: CTAB  Abd: Soft, NT, ND  Neuro: no focal deficits    Total time coordinating care > 30 mins.    Patient had opportunity to ask questions and stated understanding and agreed with therapeutic plan as outlined.     Cheyenne Larkin NP  6/12/2025    Patient seen and examined independently.  Discussed with APN and agree with note above    Patient improved.  Stable for discharge to home    Gen: No acute distress, alert and oriented x3  Pulm: Lungs clear, normal respiratory effort  CV: Heart with regular rate and rhythm  Abd:  Abdomen soft  MSK: No Lower extremity edema    Time of discharge >30 minutes    [facility] Hospitalist  Chava Steven MD
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
## 2025-06-16 NOTE — PAYOR COMM NOTE
Request for Reconsideration    --------------  ADMISSION REVIEW     Payor: [employer]  Subscriber #:  [id number]  Authorization Number: [id number]    Admit date: 6/11/25  Admit time: 12:43 AM         6/11 Patient Seen in: [facility] Emergency Department    HPI    70-year-old male who presents to the emergency department given that he has had 3 days of intermittent hematuria.  Denies any fevers.  Has not had any urinary output since yesterday but has been passing clots.  No back pain or any nausea or emesis    Physical Exam     ED Triage Vitals [06/10/25 2034]   /57   Pulse 70   Resp 22   Temp 98.5 °F (36.9 °C)   Temp src Temporal   SpO2 97 %   O2 Device None (Room air)       Physical Exam    General: Mild distress  Head: Normocephalic and atraumatic.  Mouth/Throat/Ears/Nose: Oropharynx is clear   Eyes: Conjunctivae and EOM are normal.   Neck: Normal range of motion. Supple.   Cardiovascular: Normal rate, regular rhythm, normal heart sounds.  Respiratory/Chest: Clear and equal bilaterally. Exhibits no tenderness.  Gastrointestinal: Soft, suprapubic fullness, non-distended. Bowel sounds are normal.   Musculoskeletal:No swelling or deformity.   Neurological: Alert and appropriate. No focal deficits.   Skin: Skin is warm and dry. No pallor.  Psychiatric: Has a normal mood and affect.      ED Course        Labs Reviewed   BASIC METABOLIC PANEL (8) - Abnormal; Notable for the following components:       Result Value    Glucose 135 (*)     Sodium 133 (*)     All other components within normal limits   CBC WITH DIFFERENTIAL WITH PLATELET - Abnormal; Notable for the following components:    WBC 14.8 (*)     HGB 12.5 (*)     HCT 37.1 (*)     Neutrophil Absolute Prelim 12.95 (*)     Neutrophil Absolute 12.95 (*)     Lymphocyte Absolute 0.59 (*)     Monocyte Absolute 1.17 (*)     All other components within normal limits   URINE CULTURE, ROUTINE       MDM     Vitals:    06/10/25 2034   BP: 104/57    Pulse: 70   Resp: 22   Temp: 98.5 °F (36.9 °C)   TempSrc: Temporal   SpO2: 97%   Weight: 88.5 kg       Medical Decision Making      Differential Diagnosis/ Diagnostic Considerations: Bladder cancer, postsurgical TURBT    Complicating Factors: The patient already has bladder cancer  to contribute to the complexity of this ED evaluation.    I reviewed prior chart records including office visit note from 6/9/2025.  Lab work unremarkable for acute findings on my interpretation.  CBI initiated, after 6 L still with hematuria, will admit.  Consulted  Dr. Julio, will keep NPO. Admitted to Dr. Nelson.     Admitted In stable condition.       Disposition and Plan     Clinical Impression:  1. Hematuria, unspecified type        Disposition:  Admit        Urology Consult:     Reason for Consultation:   Gross hematuria     History of Present Illness:   Patient is a 70 year old male who was admitted to the hospital for Hematuria, unspecified type:     70-year-old male admitted for gross hematuria.  Patient has a known history of bladder cancer and is status post BCG as well as most recent TURBT on 5/22/2025 from which pathology was benign.  States he has been having hematuria on and off since his surgery but it got really bad yesterday.    Impression:       70-year-old male with known bladder cancer status post TURBT on 5/22/2025 with benign pathology now presents with gross hematuria     Recommendations:  -Urine now clear after hand irrigation of clots and starting CBI.  Will leave CBI on overnight and if stays clear until the morning will clamp CBI.  If urine stays clear with CBI clamped can perform trial of void tomorrow  - Urine never sent in emergency room nurse states she has orders for urine culture      Nursing Note:    CBI continued. Manual Irrigation required this Afternoon due to blood clotting. After manual irrigation CBI output clear yellow. K+ replaced. Bowel regimen initiated today. IVF continuous. Ambulated with  SBA. Call light in reach. Plan to clamp CBI at 0600 in morning.         6/12:     Urology Progress Note:   Subjective:  Gee Doherty is a(n) 70 year old male admitted for hematuria s/p TURBT in 5/22/25  Urine is clear  CBI stopped this morning    Assessment & Plan:  Hematuria, unspecified type  History of TURBT for benign path  Suspected UTI               No culture sent               Plan for Bactrim DS PO BID x 3 days               Dose here               Urine is clear               Bello irrigated               Bello removed        Medications 06/10/25 06/11/25 06/12/25   atorvastatin (Lipitor) tab 40 mg  Dose: 40 mg  Freq: Nightly Route: OR  Start: 06/11/25 0045 End: 06/12/25 1332     (0045 DP)-Not Given   2049 AD-Given       1332-D/C'd        docusate sodium (Colace) cap 100 mg  Dose: 100 mg  Freq: 2 times daily Route: OR  Start: 06/11/25 1115 End: 06/12/25 1332   Admin Instructions:   Do not crush     1150 TJ-Given   2049 AD-Given       0905 MK-Given   1332-D/C'd       lactated ringers infusion  Freq: Once Route: IV  Start: 06/11/25 0012 End: 06/11/25 0120     0120 DP-New Bag           lisinopril (Prinivil; Zestril) tab 2.5 mg  Dose: 2.5 mg  Freq: Daily Route: OR  Start: 06/11/25 0930 End: 06/12/25 1332     0919 TJ-Given        0904 MK-Given   1332-D/C'd       polyethylene glycol (PEG 3350) (Miralax) 17 g oral packet 17 g  Dose: 17 g  Freq: Daily Route: OR  Start: 06/11/25 1115 End: 06/12/25 1332   Admin Instructions:   1 packet=17gm=1 heaping tablespoon; Dissolve in 8 oz of water     1150 TJ-Given        0905 MK-Given   1332-D/C'd       potassium chloride (Klor-Con M20) tab 40 mEq  Dose: 40 mEq  Freq: Every 4 hours Route: OR  Start: 06/11/25 1230 End: 06/11/25 1856   Admin Instructions:   Do not crush     1530 TJ-Given   1856 TJ-Given          sennosides (Senokot) tab 17.2 mg  Dose: 17.2 mg  Freq: 2 times daily Route: OR  Start: 06/11/25 1115 End: 06/12/25 1332   Admin Instructions:   Use after MiraLAX.     Use prior to Dulcolax or Fleet's Enema.     1150 TJ-Given   2049 AD-Given       0905 MK-Given   1332-D/C'd       sulfamethoxazole-trimethoprim DS (Bactrim DS) 800-160 MG per tab 1 tablet  Dose: 1 tablet  Freq: Every 12 hours scheduled Route: OR  Start: 06/12/25 0900 End: 06/12/25 1332   Admin Instructions:   Bactrim DS = 160 mg of Trimethoprim   Order specific questions:         0905 MK-Given   1332-D/C'd       tamsulosin (Flomax) cap 0.4 mg  Dose: 0.4 mg  Freq: Nightly Route: OR  Start: 06/11/25 0045 End: 06/12/25 1332     (0045 DP)-Not Given   2049 AD-Given       1332-D/C'd          Continuous Meds Sorted by Name  for BessyGee as of 6/10/25 through 6/12/25    1 Day 3 Days 7 Days 10 Days < Today >   Legend:       Medications 06/10/25 06/11/25 06/12/25   sodium chloride 0.9 % irrigation 3,000 mL  Dose: 3000 mL  Freq: Continuous Route: IR  Start: 06/11/25 0245 End: 06/12/25 0812     0251 MW-New Bag   0352 MW-New Bag   0453 MW-New Bag     0553 MW-New Bag   0700 TJ-New Bag   0756 TJ-New Bag     1016 TJ-New Bag   1130 TJ-New Bag   1415 TJ-New Bag     1445 TJ-New Bag   1650 TJ-New Bag   2055 AD-New Bag      0330 AD-New Bag   0812-D/C'd       sodium chloride 0.9% infusion  Rate: 83 mL/hr  Freq: Continuous Route: IV  Start: 06/11/25 0045 End: 06/11/25 1152     0120 DP-New Bag   0757 TJ-New Bag   1152-D/C'd         PRN Meds Sorted by Name  for Gee Doherty as of 6/10/25 through 6/12/25    1 Day 3 Days 7 Days 10 Days < Today >   Legend:       Medications 06/10/25 06/11/25 06/12/25   acetaminophen (Tylenol Extra Strength) tab 500 mg  Dose: 500 mg  Freq: Every 4 hours PRN Route: OR  PRN Reasons: Fever,moderate pain,mild pain,Headaches,severe pain  PRN Comment: equal to or greater than 100.4  Start: 06/11/25 0044 End: 06/12/25 1332   Admin Instructions:   do not initiate oral therapy until 6-8 hours after the last IV acetaminophen dose if IV acetaminophen was used previously     1320 TJ-Given          1332-D/C'd             Vitals (last day) before discharge       Date/Time Temp Pulse Resp BP SpO2 Weight O2 Device O2 Flow Rate (L/min) Who    06/12/25 0347 -- 59 16 115/65 95 % -- None (Room air) -- BS    06/11/25 2034 98.1 °F (36.7 °C) 64 18 135/67 98 % -- None (Room air) -- BS    06/11/25 1226 98.3 °F (36.8 °C) 57 18 113/59 95 % -- None (Room air) -- MJ    06/11/25 0917 99 °F (37.2 °C) 64 18 128/54 92 % -- None (Room air) -- TJ    06/11/25 0553 99.1 °F (37.3 °C) 65 17 139/66 97 % -- None (Room air) -- MM    06/11/25 0047 97.9 °F (36.6 °C) 55 18 131/58 96 % 195 lb (88.5 kg) None (Room air) -- DP    06/11/25 0036 98.5 °F (36.9 °C) 75 20 104/57 98 % -- None (Room air) -- SH

## 2025-06-16 NOTE — PAYOR COMM NOTE
--------------  DISCHARGE REVIEW    Payor: [employer]  Subscriber #:  [id number]  Authorization Number: [id number]    Admit date: 6/11/25  Admit time:  12:43 AM  Discharge Date: 6/12/2025 11:32 AM     Admitting Physician: Luis Daniel Nelson DO  Attending Physician:  No att. providers found  Primary Care Physician: Ricardo Peter MD          Discharge Summary Notes        Discharge Summary signed by Chava Steven MD at 6/12/2025 12:05 PM       Author: Chava Steven MD Specialty: HOSPITALIST Author Type: Physician    Filed: 6/12/2025 12:05 PM Date of Service: 6/12/2025 10:30 AM Status: Signed    : Chava Steven MD (Physician)    Related Notes: Original Note by Cheyenne Larkin NP (Nurse Practitioner) filed at 6/12/2025 10:37 AM           Hospitalist Discharge Summary    Admission Date/Time  6/10/2025  8:39 PM  Discharge Date  06/12/25    PCP  Ricardo Peter MD     Discharging Hospitalist:  Cheyenne Larkin NP with Dr Steven    Disposition:  Home, no needs     Follow Up Appointments  Micki Rosa PA-C  [address] [facility] [address] [facility] [address]  [address]  [facility] [address]  [phone]    Follow up in 2 week(s)  UA and PVR check    Ricardo Peter MD  [address] [facility] [address] [facility] [address]  [address]  [facility] [address]  [phone]    Follow up in 3 day(s)      Primary Hospital Problems/Hospital Course Summary  71 yo male who with hx HL, BPH, HTN, vitamin d def, CAD/MI S/P PCI LAD, eczema, bladder cancer s/p TURBT 5/22/2025 who presents with urinary retention with hematuria/clots as well as constipation s/p CBI with improvement.      Urinary Retention with hematuria/Clots  High Grade TCC Bladder Cancer   -5/22/2025 S/P TURBT with pathology c/w high grade TCC  -admission hgb 12.5, discharge hgb 12.3  -stop naproxen, use tylenol for pain  -resume asa 6/16  -6/10 s/p ryder with CBI  -follow up with urology PA 2 weeks for UA and PVR    Possible UTI  -UA suggestive of UTI in outpt setting, no urine sent in hospital  -bactrim x  3 days as per urology      Constipation   -bowel regimen     Chronic Medical Problems:     HTN- continue lisinipril  HL-continue statin  CAD/MI S/P PCI-continue statin. [facility]  BPH- continue flomax     Medication Changes  Hold asa until 6/16  Stop naproxen  Complete bactrim x 3 days for UTI       Change in Code Status: NO, full code     Discharge Medications       Medication List        PAUSE taking these medications      aspirin 81 MG Tabs  Wait to take this until: June 16, 2025            START taking these medications      Polyethylene Glycol 3350 17 g Pack  Commonly known as: MIRALAX  Start taking on: June 13, 2025     sulfamethoxazole-trimethoprim -160 MG Tabs per tablet  Commonly known as: Bactrim DS  Take 1 tablet by mouth 2 (two) times daily. Start taking 1 day prior to catheter removal            CHANGE how you take these medications      lisinopril 2.5 MG Tabs  Commonly known as: Prinivil; Zestril  Take 1 tablet (2.5 mg total) by mouth daily.  What changed: when to take this            CONTINUE taking these medications      atorvastatin 40 MG Tabs  Commonly known as: Lipitor  Take 1 tablet (40 mg total) by mouth nightly.     docusate sodium 100 MG Caps  Commonly known as: Colace     tamsulosin 0.4 MG Caps  Commonly known as: Flomax     Vitamin D 50 MCG (2000 UT) Tabs            STOP taking these medications      NAPROXEN SODIUM OR               Where to Get Your Medications        These medications were sent to [place] [id number] [place], [place] - [address] [phone], [phone]  [address] [facility] [address]      Phone: [phone]   sulfamethoxazole-trimethoprim -160 MG Tabs per tablet       I reconciled current and discharge medications on the day of discharge.      Pertinent Physical Exam At Time of Discharge  Vitals:    06/11/25 0917 06/11/25 1226 06/11/25 2034 06/12/25 0347   BP: 128/54 113/59 135/67 115/65   BP Location: Left arm Left arm  Right arm Right arm   Pulse: 64 57 64 59   Resp: 18 18 18 16   Temp: 99 °F (37.2 °C) 98.3 °F (36.8 °C) 98.1 °F (36.7 °C)    TempSrc: Oral Oral Oral    SpO2: 92% 95% 98% 95%   Weight:       Height:            General: no acute distress  Heart: RRR  Lungs: CTAB  Abd: Soft, NT, ND  Neuro: no focal deficits    Total time coordinating care > 30 mins.    Patient had opportunity to ask questions and stated understanding and agreed with therapeutic plan as outlined.     Cheyenne Larkin NP  6/12/2025    Patient seen and examined independently.  Discussed with APN and agree with note above    Patient improved.  Stable for discharge to home    Gen: No acute distress, alert and oriented x3  Pulm: Lungs clear, normal respiratory effort  CV: Heart with regular rate and rhythm  Abd: Abdomen soft  MSK: No Lower extremity edema    Time of discharge >30 minutes    [facility] Hospitalist  Chava Steven MD       Electronically signed by Chava Steven MD on 6/12/2025 12:05 PM         REVIEWER COMMENTS

## (undated) DEVICE — GLOVE,SURG,SENSICARE,ALOE,LF,PF,7: Brand: MEDLINE

## (undated) DEVICE — UROLOGY DRAIN BAG

## (undated) DEVICE — Device

## (undated) DEVICE — CUTTING LOOP, BIPOLAR, 0.30MM, 24/26 FR.: Brand: N.A.

## (undated) DEVICE — PACK CUSTOM CYSTO

## (undated) DEVICE — SOLUTION IRRIG 3000ML 0.9% NACL FLX CONT

## (undated) DEVICE — SOLUTION IRRIG 1000ML 0.9% NACL USP BTL

## (undated) DEVICE — SYRINGE,TOOMEY,IRRIGATION,70CC,STERILE: Brand: MEDLINE

## (undated) DEVICE — SOLUTION IRRIG 1000ML ST H2O AQUALITE PLAS

## (undated) DEVICE — MEDI-VAC NON-CONDUCTIVE SUCTION TUBING: Brand: CARDINAL HEALTH